# Patient Record
Sex: FEMALE | Race: WHITE | URBAN - METROPOLITAN AREA
[De-identification: names, ages, dates, MRNs, and addresses within clinical notes are randomized per-mention and may not be internally consistent; named-entity substitution may affect disease eponyms.]

---

## 2017-10-20 ENCOUNTER — HOSPITAL ENCOUNTER (OUTPATIENT)
Dept: VASCULAR LAB | Age: 67
Discharge: HOME | End: 2017-10-20
Attending: ORTHOPAEDIC SURGERY | Admitting: ORTHOPAEDIC SURGERY

## 2017-10-20 DIAGNOSIS — M79.89 OTHER SPECIFIED SOFT TISSUE DISORDERS (CODE): ICD-10-CM

## 2017-10-20 DIAGNOSIS — M79.89 LEFT LEG SWELLING: Primary | ICD-10-CM

## 2018-12-09 NOTE — PROGRESS NOTES
CP, palpitations, Hx of pericardial effusion or pericarditis  Respiratory: denies SOB, cough, hemoptysis, or pleurisy  Gastrointestinal: denies heart burn, dysphagia or esophageal dysmotility, denies change in bowel habits or Sx of IBD  Genitourinary: denies change in urine amount or urine appearance, denies frothy urine or Hx of nephrolithiasis  Hematologic/Lymphatic: denies abnormal bruising or bleeding, denies Hx of blood clots or recurrent miscarriages, denies swollen lymph nodes  Musculoskeletal:  refer to above HPI   Neurological: denies focal weakness, paresthesias/hyperesthesias or change in sensation, denies Hx of seizure, denies change in gait, balance, or memory  Psychiatric: denies Hx of depression, suicidal ideation or significant anxiety  Endocrine: +hypothyroidism on Synthroid, denies known Hx of osteoporosis, remote R wrist fx as a child, L foot fx (2014, does not recall injury)  Allergic/Immunologic: denies nasal congestion, chronic asthma, or hives    Past Medical History:   Diagnosis Date    Arthritis     Cerebral artery occlusion with cerebral infarction ([facility] [address]     Fibromyalgia     GERD (gastroesophageal reflux disease)     Glaucoma     Herniated cervical disc     Hyperlipidemia     Joint pain     Memory loss 2011    Morning joint stiffness     how long it varies    Muscle tenderness     MVP (mitral valve prolapse)     PIPE (obstructive sleep apnea)     USES BIPAP    Osteopenia     PONV (postoperative nausea and vomiting)     Rash     Rt. elbow    Thyroid disease         Past Surgical History:   Procedure Laterality Date    CARPAL TUNNEL RELEASE Bilateral     DILATION AND CURETTAGE OF UTERUS  11/22/2016    Hysteroscopy Myosure Ablation    HEEL SPUR SURGERY  1999    1998 or 1999    JOINT REPLACEMENT Right     KNEE    KNEE SURGERY Right     OTHER SURGICAL HISTORY      LUIS MIGUEL    PLANTAR FASCIA SURGERY      ROTATOR CUFF REPAIR Right     TONSILLECTOMY      TUBAL LIGATION II-XII grossly intact intact, face appears symmetrical, normal DTR, no evidence of muscle atrophy, 5/5 strength proximally and distally throughout in both upper and lower extremities, touch sensation grossly intact  INTEGUMENTARY: no malar rash, scattered telangiectasias on b/l cheeks, large patch of erythematous and scaly macular rash on the dorsal aspect of RUE immediately distal to the olecranon process c/w eczematous dermatitis, no alopecia, no petechiae, bruises, or palpable purpura, no nail or periungual changes, no clubbing or digital ulcers  PSYCH: normal mood    LABS:  I personally reviewed prior labs including:   CBC w/o diff wnl (11/5/17)  Normal renal function (10/8/18)  AST wnl (6/2/15)  TSH and free T4 wnl 10/8/18    RADIOLOGY REVIEW:  I personally reviewed prior imaging including:  MRI C-spine (1/12/16): Small moderate disc extrusion seen in the right side of the canal at the C6-7 level with slight inferior migration and narrowing of the right C7 neural foramen. Additional multilevel spondylotic changes as above. MRI L knee (8/17/17) radiology report: limited imaging through the knee shows prominent osteoarthritis. X-ray L hip (11/14/14) radiology report: arthritic changes are seen of the hip joints, left greater than right. SI joints are symmetrical. Multilevel degenerative changes are seen in the lower lumbar spine. Deformity of the right lateral ilium is again seen, unchanged, possibly due to posttraumatic or surgical deformity. DEXA study (11/30/15): osteopenia w/ T-score of -1.1 in the L femoral neck    Above results were discussed w/ the pt in detail during today's visit. ASSESSMENT/PLAN:  70 y.o. CF w/ fibromyalgia, generalized OA s/p R TKR 2013 and L TKR 2017, and osteopenia w/ Hx of L foot fx (pt does not recall antecedent injury).   PMHx pertinent for elevated BMI, DDD of L-spine, s/p L carpal tunnel release (2014) and R shoulder rotator cuff repair in 2015, PIPE on BiPAP, Hx of TIA in 11/11, HLD, and hypothyroidism. No active synovitis appreciated on exam today, clinical Hx not suggestive of inflammatory arthritis or CTD. Discussed w/ pt at length the disease course of fibromyalgia and importance of exercise, provided pt handout on disease information. Switch Zoloft to Cymbalta titrated up to 60 mg/day, provided pt handout on medicine. Cont Nabumetone 500 mg BID, check CMP. Repeat DEXA study, check vitamin D level and complete w/u for secondary causes of osteopenia. She states that she takes vitamin D 5000 IU daily w/ calcium carbonate 600 mg daily per PCP. Refer to PT and Pain Management for chronic LBP w/ radicular Sx. Refer to Dermatology for eczematous dermatitis.     Orders Placed This Encounter   Procedures    XR HAND LEFT (MIN 3 VIEWS)     Standing Status:   Future     Standing Expiration Date:   12/11/2019     Scheduling Instructions:      Evaluate for joint space narrowing, erosion, and chondrocalcinosis     Order Specific Question:   Reason for exam:     Answer:   Evaluate for joint space narrowing, erosion, and chondrocalcinosis    XR HAND RIGHT (MIN 3 VIEWS)     Standing Status:   Future     Standing Expiration Date:   12/11/2019     Scheduling Instructions:      Evaluate for joint space narrowing, erosion, and chondrocalcinosis     Order Specific Question:   Reason for exam:     Answer:   Evaluate for joint space narrowing, erosion, and chondrocalcinosis    C-Reactive Protein     Standing Status:   Future     Standing Expiration Date:   1/11/2019    Sedimentation Rate     Standing Status:   Future     Standing Expiration Date:   7/90/9466    Cyclic Citrul Peptide Antibody, IgG     Standing Status:   Future     Standing Expiration Date:   1/11/2019    Rheumatoid Factor     Standing Status:   Future     Standing Expiration Date:   1/11/2019    Vitamin D 25 Hydroxy     Standing Status:   Future     Standing Expiration Date:   4/11/2019   [facility] Comprehensive Metabolic Panel     Standing Status:   Future     Standing Expiration Date:   4/11/2019    Protein Electrophoresis, Urine     Standing Status:   Future     Standing Expiration Date:   2/11/2019    Electrophoresis Protein, Serum without Reflex to Immunofixation     Standing Status:   Future     Standing Expiration Date:   2/11/2019    PTH, Intact     Standing Status:   Future     Standing Expiration Date:   3/11/2019    CBC Auto Differential     Standing Status:   Future     Standing Expiration Date:   4/11/2019    External Referral To Dermatology     Referral Priority:   Routine     Referral Type:   Eval and Treat     Referral Reason:   Specialty Services Required     Requested Specialty:   Dermatology     Number of Visits Requested:   [id number] [facility]     Referral Priority:   Routine     Referral Type:   Eval and Treat     Referral Reason:   Specialty Services Required     Number of Visits Requested:   1     Outpatient Encounter Prescriptions as of 12/11/2018   Medication Sig Dispense Refill    methocarbamol (ROBAXIN) 750 MG tablet TAKE 1 TABLET BY MOUTH TWO TIMES A DAY  2    CPAP Machine MISC by Does not apply route      calcium carbonate 600 MG TABS tablet Take 1 tablet by mouth daily      DULoxetine (CYMBALTA) 30 MG extended release capsule Take 30 mg (1 tablet) daily for one week then increase to 60 mg (2 tablets) daily.  30 capsule 2    Loratadine-Pseudoephedrine (CLARITIN-D 12 HOUR PO) Take by mouth      Prenatal MV-Min-Fe Fum-FA-DHA (PRENATAL 1 PO) Take by mouth daily      aspirin 81 MG tablet Take 81 mg by mouth daily      nabumetone (RELAFEN) 500 MG tablet Take 500 mg by mouth 2 times daily      simvastatin (ZOCOR) 20 MG tablet Take 40 mg by mouth nightly       levothyroxine (SYNTHROID) 75 MCG tablet Take 75 mcg by mouth Daily      latanoprost (XALATAN) 0.005 % ophthalmic solution 1 drop nightly      vitamin D (CHOLECALCIFEROL) 5000 UNITS CAPS capsule Take 5,000

## 2018-12-11 ENCOUNTER — TELEPHONE (OUTPATIENT)
Dept: PAIN MANAGEMENT | Age: 68
End: 2018-12-11

## 2018-12-11 ENCOUNTER — OFFICE VISIT (OUTPATIENT)
Dept: RHEUMATOLOGY | Age: 68
End: 2018-12-11
Payer: COMMERCIAL

## 2018-12-11 VITALS
TEMPERATURE: 97.8 F | HEIGHT: 67 IN | SYSTOLIC BLOOD PRESSURE: 119 MMHG | DIASTOLIC BLOOD PRESSURE: 64 MMHG | BODY MASS INDEX: 43.1 KG/M2 | HEART RATE: 89 BPM | WEIGHT: 274.6 LBS

## 2018-12-11 DIAGNOSIS — M15.9 GENERALIZED OSTEOARTHRITIS: ICD-10-CM

## 2018-12-11 DIAGNOSIS — Z79.899 HIGH RISK MEDICATION USE: ICD-10-CM

## 2018-12-11 DIAGNOSIS — M54.41 CHRONIC RIGHT-SIDED LOW BACK PAIN WITH RIGHT-SIDED SCIATICA: ICD-10-CM

## 2018-12-11 DIAGNOSIS — E55.9 VITAMIN D DEFICIENCY: ICD-10-CM

## 2018-12-11 DIAGNOSIS — M79.7 PRIMARY FIBROMYALGIA SYNDROME: ICD-10-CM

## 2018-12-11 DIAGNOSIS — M79.7 PRIMARY FIBROMYALGIA SYNDROME: Primary | ICD-10-CM

## 2018-12-11 DIAGNOSIS — G89.29 CHRONIC RIGHT-SIDED LOW BACK PAIN WITH RIGHT-SIDED SCIATICA: ICD-10-CM

## 2018-12-11 DIAGNOSIS — L29.8 CHRONIC PRURITIC RASH IN ADULT: ICD-10-CM

## 2018-12-11 LAB
A/G RATIO: 2.1 (ref 1.1–2.2)
ALBUMIN SERPL-MCNC: 4.6 G/DL (ref 3.4–5)
ALP BLD-CCNC: 105 U/L (ref 40–129)
ALT SERPL-CCNC: 41 U/L (ref 10–40)
ANION GAP SERPL CALCULATED.3IONS-SCNC: 17 MMOL/L (ref 3–16)
AST SERPL-CCNC: 31 U/L (ref 15–37)
BASOPHILS ABSOLUTE: 0.1 K/UL (ref 0–0.2)
BASOPHILS RELATIVE PERCENT: 2.2 %
BILIRUB SERPL-MCNC: 0.4 MG/DL (ref 0–1)
BUN BLDV-MCNC: 18 MG/DL (ref 7–20)
C-REACTIVE PROTEIN: 2.9 MG/L (ref 0–5.1)
CALCIUM SERPL-MCNC: 9.7 MG/DL (ref 8.3–10.6)
CHLORIDE BLD-SCNC: 101 MMOL/L (ref 99–110)
CO2: 25 MMOL/L (ref 21–32)
CREAT SERPL-MCNC: 0.9 MG/DL (ref 0.6–1.2)
EOSINOPHILS ABSOLUTE: 0.2 K/UL (ref 0–0.6)
EOSINOPHILS RELATIVE PERCENT: 4.2 %
GFR AFRICAN AMERICAN: >60
GFR NON-AFRICAN AMERICAN: >60
GLOBULIN: 2.2 G/DL
GLUCOSE BLD-MCNC: 110 MG/DL (ref 70–99)
HCT VFR BLD CALC: 43.3 % (ref 36–48)
HEMOGLOBIN: 14.7 G/DL (ref 12–16)
LYMPHOCYTES ABSOLUTE: 1.2 K/UL (ref 1–5.1)
LYMPHOCYTES RELATIVE PERCENT: 24.6 %
MCH RBC QN AUTO: 32.9 PG (ref 26–34)
MCHC RBC AUTO-ENTMCNC: 33.9 G/DL (ref 31–36)
MCV RBC AUTO: 96.9 FL (ref 80–100)
MONOCYTES ABSOLUTE: 0.4 K/UL (ref 0–1.3)
MONOCYTES RELATIVE PERCENT: 8.5 %
NEUTROPHILS ABSOLUTE: 3 K/UL (ref 1.7–7.7)
NEUTROPHILS RELATIVE PERCENT: 60.5 %
PARATHYROID HORMONE INTACT: 47.1 PG/ML (ref 14–72)
PDW BLD-RTO: 13.7 % (ref 12.4–15.4)
PLATELET # BLD: 257 K/UL (ref 135–450)
PMV BLD AUTO: 8.7 FL (ref 5–10.5)
POTASSIUM SERPL-SCNC: 5 MMOL/L (ref 3.5–5.1)
PROTEIN PROTEIN: 0.01 G/DL
PROTEIN PROTEIN: 15 MG/DL
RBC # BLD: 4.47 M/UL (ref 4–5.2)
RHEUMATOID FACTOR: 13 IU/ML
SEDIMENTATION RATE, ERYTHROCYTE: 9 MM/HR (ref 0–30)
SODIUM BLD-SCNC: 143 MMOL/L (ref 136–145)
TOTAL PROTEIN: 6.8 G/DL (ref 6.4–8.2)
VITAMIN D 25-HYDROXY: 66.6 NG/ML
WBC # BLD: 4.9 K/UL (ref 4–11)

## 2018-12-11 PROCEDURE — 99204 OFFICE O/P NEW MOD 45 MIN: CPT | Performed by: INTERNAL MEDICINE

## 2018-12-11 RX ORDER — PHENOL 1.4 %
1 AEROSOL, SPRAY (ML) MUCOUS MEMBRANE DAILY
COMMUNITY

## 2018-12-11 RX ORDER — METHOCARBAMOL 750 MG/1
TABLET, FILM COATED ORAL
Refills: 2 | COMMUNITY
Start: 2018-10-06

## 2018-12-11 RX ORDER — DULOXETIN HYDROCHLORIDE 30 MG/1
CAPSULE, DELAYED RELEASE ORAL
Qty: 30 CAPSULE | Refills: 2 | Status: SHIPPED | OUTPATIENT
Start: 2018-12-11 | End: 2019-06-18 | Stop reason: SDUPTHER

## 2018-12-11 NOTE — PATIENT INSTRUCTIONS
products, or medicine for depression, mental illness, Parkinson's disease, migraine headaches, serious infections, or prevention of nausea and vomiting. Ask your doctor before making any changes in how or when you take your medications. To make sure duloxetine is safe for you, tell your doctor if you have ever had:  · liver or kidney disease;  · seizures or epilepsy;  · a bleeding or blood clotting disorder;  · high blood pressure;  · narrow-angle glaucoma;  · bipolar disorder (manic depression); or  · drug addiction or suicidal thoughts. Some young people have thoughts about suicide when first taking an antidepressant. Your doctor will need to check your progress at regular visits while you are using duloxetine. Your family or other caregivers should also be alert to changes in your mood or symptoms. It is not known whether duloxetine will harm an unborn baby. However, duloxetine may cause problems in a  if you take the medicine during the third trimester of pregnancy. Tell your doctor if you are pregnant or plan to become pregnant while using this medicine. If you are pregnant, your name may be listed on a pregnancy registry. This is to track the outcome of the pregnancy and to evaluate any effects of duloxetine on the baby. Duloxetine can pass into breast milk, but effects on the nursing baby are not known. Tell your doctor if you are breast-feeding. Duloxetine is not approved for use by anyone younger than 25years old. How should I take duloxetine? Follow all directions on your prescription label. Do not take this medicine in larger or smaller amounts or for longer than recommended. You may take duloxetine with or without food. Do not crush, chew, break, or open an extended-release capsule. Swallow it whole. It may take 1 to 4 weeks before your symptoms improve. Keep using the medication as directed. Do not stop using duloxetine without first talking to your doctor.   You may have unpleasant brain chemicals (serotonin and norepinephrine) that help control pain levels: duloxetine (Cymbalta) and milnacipran (Savella). Older drugs that affect these same brain chemicals also may be used to treat fibromyalgia. These include amitriptyline (Elavil) and cyclobenzaprine (Flexeril). Other antidepressant drugs can be helpful in some patients. Side effects vary by the drug. Ask your doctor about the risks and benefits of your medicine. The other drug approved for fibromyalgia is pregabalin (Lyrica). Pregabalin and another drug, gabapentin (Neurontin), work by blocking the over activity of nerve cells involved in pain transmission. These medicines may cause dizziness, sleepiness, swelling and weight gain. Doctors do not recommend opioid narcotics for treating fibromyalgia. The reason for this is that research evidence suggests these drugs are not of great benefit to most people with fibromyalgia. In fact, they may cause greater pain sensitivity or make pain persist. Tramadol (Ultram) may be used to treat fibromyalgia pain if short-term use of an opioid narcotic is needed. Over-the-counter medicines such as acetaminophen (Tylenol) or nonsteroidal anti-inflammatory drugs (commonly called NSAIDs) like ibuprofen (Advil, Motrin) or naproxen (Aleve, Anaprox) are not effective for fibromyalgia pain. Yet, these drugs may be useful to treat the pain triggers of fibromyalgia. Thus, they are most useful in people who have other causes for pain such as arthritis in addition to fibromyalgia. For sleep problems, some of the medicines that treat pain also improve sleep. These include cyclobenzaprine (Flexeril), amitriptyline (Elavil), gabapentin (Neurontin) or pregabalin (Lyrica). It is not recommended that patients with fibromyalgia take sleeping medicines like zolpidem (Ambien) or benzodiazepine medications.   Other Therapies: People with fibromyalgia should use non-drug treatments as well as any medicines their doctors

## 2018-12-12 ENCOUNTER — TELEPHONE (OUTPATIENT)
Dept: RHEUMATOLOGY | Age: 68
End: 2018-12-12

## 2018-12-12 LAB
ALBUMIN SERPL-MCNC: 3.6 G/DL (ref 3.1–4.9)
ALPHA-1-GLOBULIN: 0.3 G/DL (ref 0.2–0.4)
ALPHA-2-GLOBULIN: 0.9 G/DL (ref 0.4–1.1)
BETA GLOBULIN: 1.3 G/DL (ref 0.9–1.6)
GAMMA GLOBULIN: 0.8 G/DL (ref 0.6–1.8)
SPE/IFE INTERPRETATION: NORMAL
URINE ELECTROPHORESIS INTERP: NORMAL

## 2018-12-13 ENCOUNTER — TELEPHONE (OUTPATIENT)
Dept: RHEUMATOLOGY | Age: 68
End: 2018-12-13

## 2018-12-13 LAB — CCP IGG ANTIBODIES: 4 UNITS (ref 0–19)

## 2018-12-14 ENCOUNTER — TELEPHONE (OUTPATIENT)
Dept: INTERNAL MEDICINE CLINIC | Age: 68
End: 2018-12-14

## 2018-12-17 ENCOUNTER — TELEPHONE (OUTPATIENT)
Dept: INTERNAL MEDICINE CLINIC | Age: 68
End: 2018-12-17

## 2019-02-19 RX ORDER — DULOXETIN HYDROCHLORIDE 60 MG/1
60 CAPSULE, DELAYED RELEASE ORAL DAILY
Qty: 90 CAPSULE | Refills: 1 | Status: SHIPPED | OUTPATIENT
Start: 2019-02-19 | End: 2019-06-18 | Stop reason: SDUPTHER

## 2019-06-18 RX ORDER — DULOXETIN HYDROCHLORIDE 60 MG/1
60 CAPSULE, DELAYED RELEASE ORAL DAILY
Qty: 90 CAPSULE | Refills: 1 | Status: SHIPPED | OUTPATIENT
Start: 2019-06-18 | End: 2019-12-24

## 2019-12-24 RX ORDER — DULOXETIN HYDROCHLORIDE 60 MG/1
60 CAPSULE, DELAYED RELEASE ORAL DAILY
Qty: 90 CAPSULE | Refills: 1 | Status: SHIPPED | OUTPATIENT
Start: 2019-12-24

## 2021-12-09 ENCOUNTER — PROCEDURE VISIT (OUTPATIENT)
Dept: AUDIOLOGY | Age: 71
End: 2021-12-09
Payer: COMMERCIAL

## 2021-12-09 DIAGNOSIS — H90.3 SENSORINEURAL HEARING LOSS (SNHL) OF BOTH EARS: Primary | ICD-10-CM

## 2021-12-09 DIAGNOSIS — H93.13 SUBJECTIVE TINNITUS OF BOTH EARS: ICD-10-CM

## 2021-12-09 PROCEDURE — 92557 COMPREHENSIVE HEARING TEST: CPT | Performed by: AUDIOLOGIST

## 2021-12-09 PROCEDURE — 92567 TYMPANOMETRY: CPT | Performed by: AUDIOLOGIST

## 2021-12-09 NOTE — PROGRESS NOTES
[facility]  Division of Audiology/Otolaryngology    12/9/2021     PCP: Purnima Silverman   MRN: [id number]     AUDIOLOGIC AND OTHER PERTINENT MEDICAL HISTORY:      Armen Hilliard was seen for hearing and middle ear evaluation. Rachel Padilla DO is referral source of today's appointment. Patient presents with pulsating tinnitusperceived as worse in right ear. She notices the tinnitus changes in pitch, only if she happens to lay down. She feels hearing inability is greater in right ear. ASSESSMENT AND FINDINGS:     Otoscopy revealed: Visible tympanic membrane bilaterally    RIGHT EAR:  Hearing Sensitivity: Mild-moderate sensory loss   Word Recognition: Excellent (%), based on NU-6   Tympanometry: Normal peak pressure and compliance, Type A tympanogram, consistent with normal middle ear function. Acoustic Reflexes: Ipsilateral: Absent. LEFT EAR:  Hearing Sensitivity: Mild-moderate sensory loss   Word Recognition: Excellent (%), based on NU-6   Tympanometry: Normal peak pressure and compliance, Type A tympanogram, consistent with normal middle ear function. Acoustic Reflexes: Ipsilateral: Absent. Quick SIN (hearing performance in noise) assessment was administered. Armen Hilliard scored  20/25.5, consistent with 5.5 dB signal-noise ratio loss. This suggests difficulty hearing during the presence of noise or complex listening enviornments. COUNSELING:        Reviewed purpose of testing completed today, general auditory system function, and results obtained today. Patient was provided with a copy of audiogram.           Degree of   Hearing Sensitivity dB Range   Within Normal Limits (WNL) 0 - 20   Mild 20 - 40   Moderate 40 - 55   Moderately-Severe 55 - 70   Severe 70 - 90   Profound 90 +          RECOMMENDATIONS:          Rachel Padilla DO to medically advise.       Electronically signed by Yulissa Bergman on 12/9/21 at 3:58 PM.

## 2021-12-21 ENCOUNTER — OFFICE VISIT (OUTPATIENT)
Dept: ENT CLINIC | Age: 71
End: 2021-12-21
Payer: COMMERCIAL

## 2021-12-21 VITALS — HEART RATE: 83 BPM | SYSTOLIC BLOOD PRESSURE: 145 MMHG | DIASTOLIC BLOOD PRESSURE: 87 MMHG | TEMPERATURE: 97.1 F

## 2021-12-21 DIAGNOSIS — H90.3 SENSORINEURAL HEARING LOSS (SNHL) OF BOTH EARS: Primary | ICD-10-CM

## 2021-12-21 DIAGNOSIS — H93.13 TINNITUS OF BOTH EARS: ICD-10-CM

## 2021-12-21 DIAGNOSIS — J30.9 ALLERGIC RHINITIS, UNSPECIFIED SEASONALITY, UNSPECIFIED TRIGGER: ICD-10-CM

## 2021-12-21 PROCEDURE — 99203 OFFICE O/P NEW LOW 30 MIN: CPT | Performed by: STUDENT IN AN ORGANIZED HEALTH CARE EDUCATION/TRAINING PROGRAM

## 2021-12-21 RX ORDER — FLUTICASONE PROPIONATE 50 MCG
1 SPRAY, SUSPENSION (ML) NASAL DAILY
Qty: 32 G | Refills: 1 | Status: SHIPPED | OUTPATIENT
Start: 2021-12-21

## 2021-12-21 NOTE — PROGRESS NOTES
[address] [facility]  NEW PATIENT HISTORY AND PHYSICAL NOTE      Patient Name: [address] [place] [address] Record Number:  [id number]  Primary Care Physician:  Silvestre Perry    ChiefComplaint     Chief Complaint   Patient presents with    Other     Review hearing test, patient states she has had diminished hearing, she hears noise or cricket sounds in her ears, both ears vary with noise, and feels a wooshing noise as well. History of Present Illness     Xiomara Muniz is an 66 y.o. female presenting with hearing loss. Hearing seems diminished in both ears over the last several months. Feels like understanding conversations is hard. Hearing \"cricket\" sounds in the wears. Hearing a tone in her right ear, goran pitched at times. Occasionally has wooshing sound in both ears, not patterned like heartbeat - this sound is not consistent. No otalgia. No otorrhea. No history of chronic ear infections. No history of otologic surgery. No family history of early onset hearing loss. No loud noise exposures. Denies exposure to chemotherapy. Denies vertigo.       Past Medical History     Past Medical History:   Diagnosis Date    Arthritis     Cerebral artery occlusion with cerebral infarction ([facility] [address]     Fibromyalgia     GERD (gastroesophageal reflux disease)     Glaucoma     Herniated cervical disc     Hyperlipidemia     Joint pain     Memory loss 2011    Morning joint stiffness     how long it varies    Muscle tenderness     MVP (mitral valve prolapse)     PIPE (obstructive sleep apnea)     USES BIPAP    Osteopenia     PONV (postoperative nausea and vomiting)     Rash     Rt. elbow    Thyroid disease        Past Surgical History     Past Surgical History:   Procedure Laterality Date    CARPAL TUNNEL RELEASE Bilateral     DILATION AND CURETTAGE OF UTERUS  11/22/2016    Hysteroscopy Myosure Ablation   [address] or 1999    JOINT REPLACEMENT Right     KNEE    KNEE SURGERY Right     OTHER SURGICAL HISTORY      LUIS MIGUEL    PLANTAR FASCIA SURGERY      ROTATOR CUFF REPAIR Right     TONSILLECTOMY      TUBAL LIGATION         Family History     Family History   Problem Relation Age of Onset    Dementia Mother     Heart Disease Mother     Alzheimer's Disease Father     Cancer Father     Heart Disease Maternal Grandmother     Stroke Maternal Grandmother     Cancer Maternal Grandfather     Alcohol Abuse Maternal Grandfather        Social History     Social History     Tobacco Use    Smoking status: Never Smoker    Smokeless tobacco: Never Used   Vaping Use    Vaping Use: Never used   Substance Use Topics    Alcohol use: No    Drug use: No        Allergies     Allergies   Allergen Reactions    Amoxicillin Hives     SEVERE HIVES, ORAL, FACIAL SWELLING    Codeine     Demerol Hcl [Meperidine]     Indocin [Indomethacin]      Sores in mouth    Nsaids      Sores in mouth    Theophyllines        Medications     Current Outpatient Medications   Medication Sig Dispense Refill    fluticasone (FLONASE) 50 MCG/ACT nasal spray 1 spray by Each Nostril route daily 32 g 1    DULoxetine (CYMBALTA) 60 MG extended release capsule TAKE 1 CAPSULE BY MOUTH  DAILY 90 capsule 1    methocarbamol (ROBAXIN) 750 MG tablet TAKE 1 TABLET BY MOUTH TWO TIMES A DAY  2    CPAP Machine MISC by Does not apply route      calcium carbonate 600 MG TABS tablet Take 1 tablet by mouth daily      Loratadine-Pseudoephedrine (CLARITIN-D 12 HOUR PO) Take by mouth      Prenatal MV-Min-Fe Fum-FA-DHA (PRENATAL 1 PO) Take by mouth daily      aspirin 81 MG tablet Take 81 mg by mouth daily      nabumetone (RELAFEN) 500 MG tablet Take 500 mg by mouth 2 times daily      simvastatin (ZOCOR) 20 MG tablet Take 40 mg by mouth nightly       levothyroxine (SYNTHROID) 75 MCG tablet Take 75 mcg by mouth Daily      latanoprost (XALATAN) 0.005 % ophthalmic solution 1 drop nightly      vitamin D (CHOLECALCIFEROL) 5000 UNITS CAPS capsule Take 5,000 Units by mouth nightly       HYDROcodone-acetaminophen (NORCO) 5-325 MG per tablet Take 1 tablet by mouth every 6 hours as needed for Pain       No current facility-administered medications for this visit. Review of Systems     REVIEW OF SYSTEMS  The following systems were reviewed and revealed the following in addition to any already discussed in the HPI:    CONSTITUTIONAL: no weight loss, no fever, no night sweats, no chills  EYES: no vision changes, no blurry vision  EARS: +hearing loss, no otalgia  NOSE: no epistaxis, no rhinorrhea  THROAT: No voice changes, no sore throat, no dysphagia      PhysicalExam     Vitals:    12/21/21 1045   BP: (!) 145/87   Site: Left Upper Arm   Position: Sitting   Cuff Size: Large Adult   Pulse: 83   Temp: 97.1 °F (36.2 °C)   TempSrc: Temporal       PHYSICAL EXAM  BP (!) 145/87 (Site: Left Upper Arm, Position: Sitting, Cuff Size: Large Adult)   Pulse 83   Temp 97.1 °F (36.2 °C) (Temporal)     GENERAL: No acute distress, alert and oriented, no hoarseness  EYES: EOMI, Anti-icteric  NOSE: On anterior rhinoscopy there is no epistaxis, nasal mucosa moist and normal appearing, no purulent drainage. EARS: Normal external appearance; on portable otomicroscopy:     -Ad: External auditory canal without stenosis, tympanic membrane clear, no middle ear effusions or retractions.      -As: External auditory canal without stenosis, tympanic membrane clear, no middle ear effusions or retractions.    Pneumatic otoscopy: Bilateral tympanic membranes mobile pneumatic otoscopy  FACE: HB 1/6 bilaterally, symmetric appearing, sensation equal bilaterally  ORAL CAVITY: No masses or lesions visualized or palpated, uvula is midline, moist mucous membranes, absent tonsils  NECK: Normal range of motion, no thyromegaly, trachea is midline, no palpable lymphadenopathy or neck masses, no crepitus  NEURO: Cranial Nerves 2, 3, 4, 5, 6, 7, 11, 12 grossly intact bilaterally     I have performed a head and neck physical exam personally or was physically present during the key or critical portions of the service. Data/Imaging Review     Comprehensive audiological evaluation performed in the office today by Adam BUSBY was independently reviewed by myself which demonstrates: Au: Mild to moderate sensorineural hearing loss    WRS 92% right, WRS 96% left. Type A tympanogram right. Type A tympanogram left. Assessment and Plan     1. Sensorineural hearing loss (SNHL) of both ears  - Would likely benefit from amplification with hearing aids. Provided with hearing aid clearance form as well as copy of audiogram.  - Encouraged loud noise avoidance and wearing of hearing protection when exposed. - Recommend surveillance of hearing with comprehensive audiological evaluation in 1-2 years. 2. Tinnitus of both ears  - Masking techniques  - Hearing aids with tinnitus masking technology    3. Allergic rhinitis, unspecified seasonality, unspecified trigger  - Start Flonase daily      Follow Up     Return if symptoms worsen or fail to improve. Dr. [place] [id number]  Department of Otolaryngology/Head & Neck Surgery  12/21/21    Medical Decision Making: The following items were considered in medical decision making:  Independent review of images  Review / order clinical lab tests  Review / order radiology tests  Decision to obtain old records    This note was generated completely or in part utilizing Dragon dictation speech recognition software. Occasionally, words are mistranscribed and despite editing, the text may contain inaccuracies due to incorrect word recognition. If further clarification is needed please contact the office at [phone].

## 2024-09-05 RX ORDER — GABAPENTIN 300 MG/1
300 CAPSULE ORAL 2 TIMES DAILY
COMMUNITY

## 2024-09-05 RX ORDER — ASCORBIC ACID 500 MG
1000 TABLET ORAL DAILY
COMMUNITY

## 2024-09-05 NOTE — PROGRESS NOTES
DATE _9/6__      PLACE ___ [address]                          TIME _1100__                                             __x_ [address]                           Arrival _0930__     Surgeons office to give time ___      Surgery dates,times and arrival times are subject to change.Please check with your surgeon.  Bring a photo I.D.,insurance card and form of payment if you have a co pay.  Plan for someone 18 years or older to stay on site while you are her and to take you home after surgery.You are not permitted to drive yourself home. You cannot leave via Uber, Lyft, Taxi or Medical Transport by yourself. You must have someone with you. It is strongly suggested that someone stay with you the first 24 hours after anesthesia. Your surgery will be cancelled if you do not have a ride home. A parent or legal guardian guardian must stay with a child until the child is discharged. Please do not bring other children.  A History/Physical is required to be completed and dated within 30 days of your surgery. To be done by PCP __ Surgeon _x__or Hospitalist ___  You may be required to get labs __ EKG __ or a chest Xray ___ prior to surgery. To be done by ____  Nothing to eat or drink after midnight the evening prior to surgery.  If your surgeon requires a bowel prep, follow their instructions.  You may brush your teeth.  Bring a complete list of your medications including vitamins and supplement with the name,dose and frequency.  Take the following medications with a sip of water the AM of surgery ___levothyroxine_________________________________   DR Levine patients do not take any medications the AM of surgery.  If you can not be reached by phone to give specific medication instructions,you may use your inhalers,take your heart, blood pressure,seizure and thyroid medications with a sip of water the AM of surgery. Bring your rescue inhaler with you if you use one.  DO NOT take blood pressure medications ending in  until you are assigned a room.  If you develop any illness prior to surgery let your surgeon know (fever,cough ,cold sore throat,nausea or vomiting).  If you have any skin breakdown,signs of infection or dental issues, notify your surgeon.  Make sure your voice mail is set up and not full so you are able to receive messages regarding your surgery.  Visitor policies are subject to change- check with your care team.   Masking is at the discretion of the facility.  Other ________________________________________________________________________________________________________      For any questions call Pre-Admission testing at  [phone]   Fax  [phone]    All of the above information was  reviewed with patient/caregiver and they verbalize understanding.    Patient/Representative per phone _x__  Patient not reached instructions left on voicemail ___ Office notified unable to reach _____  Above instructions sent to MY Chart ___

## 2024-09-06 ENCOUNTER — ANESTHESIA (OUTPATIENT)
Dept: OPERATING ROOM | Age: 74
End: 2024-09-06
Payer: MEDICARE

## 2024-09-06 ENCOUNTER — HOSPITAL ENCOUNTER (OUTPATIENT)
Age: 74
Setting detail: OUTPATIENT SURGERY
Discharge: HOME | End: 2024-09-06
Attending: ANESTHESIOLOGY | Admitting: ANESTHESIOLOGY
Payer: MEDICARE

## 2024-09-06 ENCOUNTER — ANESTHESIA EVENT (OUTPATIENT)
Dept: OPERATING ROOM | Age: 74
End: 2024-09-06
Payer: MEDICARE

## 2024-09-06 ENCOUNTER — APPOINTMENT (OUTPATIENT)
Dept: GENERAL RADIOLOGY | Age: 74
End: 2024-09-06
Attending: ANESTHESIOLOGY
Payer: MEDICARE

## 2024-09-06 VITALS
RESPIRATION RATE: 21 BRPM | OXYGEN SATURATION: 96 % | DIASTOLIC BLOOD PRESSURE: 64 MMHG | HEART RATE: 74 BPM | HEIGHT: 67 IN | TEMPERATURE: 97.6 F | BODY MASS INDEX: 44.65 KG/M2 | SYSTOLIC BLOOD PRESSURE: 133 MMHG | WEIGHT: 284.5 LBS

## 2024-09-06 PROCEDURE — 7100000001 HC PACU RECOVERY - ADDTL 15 MIN: Performed by: ANESTHESIOLOGY

## 2024-09-06 PROCEDURE — 3700000001 HC ADD 15 MINUTES (ANESTHESIA): Performed by: ANESTHESIOLOGY

## 2024-09-06 PROCEDURE — 6360000002 HC RX W HCPCS: Performed by: NURSE ANESTHETIST, CERTIFIED REGISTERED

## 2024-09-06 PROCEDURE — 3600000012 HC SURGERY LEVEL 2 ADDTL 15MIN: Performed by: ANESTHESIOLOGY

## 2024-09-06 PROCEDURE — C1889 IMPLANT/INSERT DEVICE, NOC: HCPCS | Performed by: ANESTHESIOLOGY

## 2024-09-06 PROCEDURE — 7100000010 HC PHASE II RECOVERY - FIRST 15 MIN: Performed by: ANESTHESIOLOGY

## 2024-09-06 PROCEDURE — 7100000000 HC PACU RECOVERY - FIRST 15 MIN: Performed by: ANESTHESIOLOGY

## 2024-09-06 PROCEDURE — 2580000003 HC RX 258: Performed by: ANESTHESIOLOGY

## 2024-09-06 PROCEDURE — 2500000003 HC RX 250 WO HCPCS: Performed by: ANESTHESIOLOGY

## 2024-09-06 PROCEDURE — 7100000011 HC PHASE II RECOVERY - ADDTL 15 MIN: Performed by: ANESTHESIOLOGY

## 2024-09-06 PROCEDURE — 3600000002 HC SURGERY LEVEL 2 BASE: Performed by: ANESTHESIOLOGY

## 2024-09-06 PROCEDURE — 6360000002 HC RX W HCPCS: Performed by: ANESTHESIOLOGY

## 2024-09-06 PROCEDURE — 6370000000 HC RX 637 (ALT 250 FOR IP): Performed by: ANESTHESIOLOGY

## 2024-09-06 PROCEDURE — 3700000000 HC ANESTHESIA ATTENDED CARE: Performed by: ANESTHESIOLOGY

## 2024-09-06 PROCEDURE — 2709999900 HC NON-CHARGEABLE SUPPLY: Performed by: ANESTHESIOLOGY

## 2024-09-06 PROCEDURE — 2500000003 HC RX 250 WO HCPCS: Performed by: NURSE ANESTHETIST, CERTIFIED REGISTERED

## 2024-09-06 RX ORDER — PROCHLORPERAZINE EDISYLATE 5 MG/ML
5 INJECTION INTRAMUSCULAR; INTRAVENOUS
Status: DISCONTINUED | OUTPATIENT
Start: 2024-09-06 | End: 2024-09-06 | Stop reason: HOSPADM

## 2024-09-06 RX ORDER — SODIUM CHLORIDE 9 MG/ML
INJECTION, SOLUTION INTRAVENOUS PRN
Status: DISCONTINUED | OUTPATIENT
Start: 2024-09-06 | End: 2024-09-06 | Stop reason: HOSPADM

## 2024-09-06 RX ORDER — DEXMEDETOMIDINE HYDROCHLORIDE 100 UG/ML
INJECTION, SOLUTION INTRAVENOUS PRN
Status: DISCONTINUED | OUTPATIENT
Start: 2024-09-06 | End: 2024-09-06 | Stop reason: SDUPTHER

## 2024-09-06 RX ORDER — LIDOCAINE HYDROCHLORIDE 10 MG/ML
0.5 INJECTION, SOLUTION EPIDURAL; INFILTRATION; INTRACAUDAL; PERINEURAL ONCE
Status: DISCONTINUED | OUTPATIENT
Start: 2024-09-06 | End: 2024-09-06 | Stop reason: HOSPADM

## 2024-09-06 RX ORDER — LIDOCAINE HYDROCHLORIDE 20 MG/ML
INJECTION, SOLUTION INFILTRATION; PERINEURAL PRN
Status: DISCONTINUED | OUTPATIENT
Start: 2024-09-06 | End: 2024-09-06 | Stop reason: SDUPTHER

## 2024-09-06 RX ORDER — SODIUM CHLORIDE 0.9 % (FLUSH) 0.9 %
5-40 SYRINGE (ML) INJECTION PRN
Status: DISCONTINUED | OUTPATIENT
Start: 2024-09-06 | End: 2024-09-06 | Stop reason: HOSPADM

## 2024-09-06 RX ORDER — HYDROMORPHONE HYDROCHLORIDE 2 MG/ML
0.25 INJECTION, SOLUTION INTRAMUSCULAR; INTRAVENOUS; SUBCUTANEOUS EVERY 5 MIN PRN
Status: DISCONTINUED | OUTPATIENT
Start: 2024-09-06 | End: 2024-09-06 | Stop reason: HOSPADM

## 2024-09-06 RX ORDER — IOPAMIDOL 612 MG/ML
INJECTION, SOLUTION INTRATHECAL
Status: DISCONTINUED | OUTPATIENT
Start: 2024-09-06 | End: 2024-09-06 | Stop reason: HOSPADM

## 2024-09-06 RX ORDER — ONDANSETRON 2 MG/ML
INJECTION INTRAMUSCULAR; INTRAVENOUS PRN
Status: DISCONTINUED | OUTPATIENT
Start: 2024-09-06 | End: 2024-09-06 | Stop reason: SDUPTHER

## 2024-09-06 RX ORDER — OXYCODONE HYDROCHLORIDE 5 MG/1
10 TABLET ORAL PRN
Status: DISCONTINUED | OUTPATIENT
Start: 2024-09-06 | End: 2024-09-06 | Stop reason: HOSPADM

## 2024-09-06 RX ORDER — IPRATROPIUM BROMIDE AND ALBUTEROL SULFATE 2.5; .5 MG/3ML; MG/3ML
1 SOLUTION RESPIRATORY (INHALATION)
Status: DISCONTINUED | OUTPATIENT
Start: 2024-09-06 | End: 2024-09-06 | Stop reason: HOSPADM

## 2024-09-06 RX ORDER — FAMOTIDINE 10 MG/ML
INJECTION, SOLUTION INTRAVENOUS PRN
Status: DISCONTINUED | OUTPATIENT
Start: 2024-09-06 | End: 2024-09-06 | Stop reason: SDUPTHER

## 2024-09-06 RX ORDER — OXYCODONE HYDROCHLORIDE 5 MG/1
5 TABLET ORAL
Status: DISCONTINUED | OUTPATIENT
Start: 2024-09-06 | End: 2024-09-06 | Stop reason: HOSPADM

## 2024-09-06 RX ORDER — METHOCARBAMOL 100 MG/ML
INJECTION, SOLUTION INTRAMUSCULAR; INTRAVENOUS PRN
Status: DISCONTINUED | OUTPATIENT
Start: 2024-09-06 | End: 2024-09-06 | Stop reason: SDUPTHER

## 2024-09-06 RX ORDER — SODIUM CHLORIDE 9 MG/ML
INJECTION, SOLUTION INTRAVENOUS CONTINUOUS
Status: DISCONTINUED | OUTPATIENT
Start: 2024-09-06 | End: 2024-09-06 | Stop reason: HOSPADM

## 2024-09-06 RX ORDER — APREPITANT 40 MG/1
40 CAPSULE ORAL ONCE
Status: COMPLETED | OUTPATIENT
Start: 2024-09-06 | End: 2024-09-06

## 2024-09-06 RX ORDER — NALOXONE HYDROCHLORIDE 0.4 MG/ML
INJECTION, SOLUTION INTRAMUSCULAR; INTRAVENOUS; SUBCUTANEOUS PRN
Status: DISCONTINUED | OUTPATIENT
Start: 2024-09-06 | End: 2024-09-06 | Stop reason: HOSPADM

## 2024-09-06 RX ORDER — ACETAMINOPHEN 500 MG
1000 TABLET ORAL ONCE
Status: COMPLETED | OUTPATIENT
Start: 2024-09-06 | End: 2024-09-06

## 2024-09-06 RX ORDER — ONDANSETRON 2 MG/ML
4 INJECTION INTRAMUSCULAR; INTRAVENOUS
Status: DISCONTINUED | OUTPATIENT
Start: 2024-09-06 | End: 2024-09-06 | Stop reason: HOSPADM

## 2024-09-06 RX ORDER — MEPERIDINE HYDROCHLORIDE 25 MG/ML
12.5 INJECTION INTRAMUSCULAR; INTRAVENOUS; SUBCUTANEOUS EVERY 5 MIN PRN
Status: DISCONTINUED | OUTPATIENT
Start: 2024-09-06 | End: 2024-09-06 | Stop reason: HOSPADM

## 2024-09-06 RX ORDER — MIDAZOLAM HYDROCHLORIDE 1 MG/ML
INJECTION INTRAMUSCULAR; INTRAVENOUS PRN
Status: DISCONTINUED | OUTPATIENT
Start: 2024-09-06 | End: 2024-09-06 | Stop reason: SDUPTHER

## 2024-09-06 RX ORDER — HYDROMORPHONE HYDROCHLORIDE 2 MG/ML
0.5 INJECTION, SOLUTION INTRAMUSCULAR; INTRAVENOUS; SUBCUTANEOUS EVERY 5 MIN PRN
Status: DISCONTINUED | OUTPATIENT
Start: 2024-09-06 | End: 2024-09-06 | Stop reason: HOSPADM

## 2024-09-06 RX ORDER — SODIUM CHLORIDE 0.9 % (FLUSH) 0.9 %
5-40 SYRINGE (ML) INJECTION EVERY 12 HOURS SCHEDULED
Status: DISCONTINUED | OUTPATIENT
Start: 2024-09-06 | End: 2024-09-06 | Stop reason: HOSPADM

## 2024-09-06 RX ORDER — PROPOFOL 10 MG/ML
INJECTION, EMULSION INTRAVENOUS PRN
Status: DISCONTINUED | OUTPATIENT
Start: 2024-09-06 | End: 2024-09-06 | Stop reason: SDUPTHER

## 2024-09-06 RX ADMIN — DEXMEDETOMIDINE HYDROCHLORIDE 2 MCG: 100 INJECTION, SOLUTION INTRAVENOUS at 10:19

## 2024-09-06 RX ADMIN — PROPOFOL 20 MG: 10 INJECTION, EMULSION INTRAVENOUS at 10:25

## 2024-09-06 RX ADMIN — MIDAZOLAM 1 MG: 1 INJECTION INTRAMUSCULAR; INTRAVENOUS at 10:03

## 2024-09-06 RX ADMIN — ONDANSETRON 4 MG: 2 INJECTION INTRAMUSCULAR; INTRAVENOUS at 10:12

## 2024-09-06 RX ADMIN — DEXMEDETOMIDINE HYDROCHLORIDE 4 MCG: 100 INJECTION, SOLUTION INTRAVENOUS at 10:03

## 2024-09-06 RX ADMIN — PROPOFOL 20 MG: 10 INJECTION, EMULSION INTRAVENOUS at 10:04

## 2024-09-06 RX ADMIN — SODIUM CHLORIDE 3000 MG: 900 INJECTION INTRAVENOUS at 09:54

## 2024-09-06 RX ADMIN — MIDAZOLAM 0.5 MG: 1 INJECTION INTRAMUSCULAR; INTRAVENOUS at 10:01

## 2024-09-06 RX ADMIN — METHOCARBAMOL 500 MG: 100 INJECTION INTRAMUSCULAR; INTRAVENOUS at 10:07

## 2024-09-06 RX ADMIN — MIDAZOLAM 0.5 MG: 1 INJECTION INTRAMUSCULAR; INTRAVENOUS at 10:00

## 2024-09-06 RX ADMIN — PROPOFOL 50 MCG/KG/MIN: 10 INJECTION, EMULSION INTRAVENOUS at 10:07

## 2024-09-06 RX ADMIN — FAMOTIDINE 20 MG: 10 INJECTION INTRAVENOUS at 10:00

## 2024-09-06 RX ADMIN — APREPITANT 40 MG: 40 CAPSULE ORAL at 09:51

## 2024-09-06 RX ADMIN — ACETAMINOPHEN 1000 MG: 500 TABLET ORAL at 09:51

## 2024-09-06 RX ADMIN — LIDOCAINE HYDROCHLORIDE 40 MG: 20 INJECTION, SOLUTION INFILTRATION; PERINEURAL at 10:04

## 2024-09-06 RX ADMIN — SODIUM CHLORIDE: 9 INJECTION, SOLUTION INTRAVENOUS at 10:00

## 2024-09-06 ASSESSMENT — PAIN DESCRIPTION - PAIN TYPE
TYPE: CHRONIC PAIN
TYPE: CHRONIC PAIN

## 2024-09-06 ASSESSMENT — PAIN SCALES - GENERAL
PAINLEVEL_OUTOF10: 9
PAINLEVEL_OUTOF10: 5

## 2024-09-06 ASSESSMENT — PAIN DESCRIPTION - FREQUENCY: FREQUENCY: CONTINUOUS

## 2024-09-06 ASSESSMENT — PAIN DESCRIPTION - DESCRIPTORS
DESCRIPTORS: ACHING

## 2024-09-06 ASSESSMENT — PAIN DESCRIPTION - ORIENTATION
ORIENTATION: LEFT
ORIENTATION: LEFT

## 2024-09-06 ASSESSMENT — PAIN DESCRIPTION - LOCATION
LOCATION: HIP
LOCATION: HIP

## 2024-09-06 ASSESSMENT — PAIN - FUNCTIONAL ASSESSMENT
PAIN_FUNCTIONAL_ASSESSMENT: 0-10
PAIN_FUNCTIONAL_ASSESSMENT: PREVENTS OR INTERFERES SOME ACTIVE ACTIVITIES AND ADLS

## 2024-09-06 NOTE — ANESTHESIA POSTPROCEDURE EVALUATION
Department of Anesthesiology  Postprocedure Note    Patient: Julia Garcia  MRN: [id number]  YOB: 1950  Date of evaluation: 9/6/2024    Procedure Summary       Date: 09/06/24 Room / Location: [address]    Anesthesia Start: 1000 Anesthesia Stop: 1052    Procedure: BILATERAL L3-4 L4-5 MINIMALLY INVASIVE LUMBAR DECOMPRESSION-VERTOS (Bilateral: Spine Lumbar) Diagnosis:       Spinal stenosis of lumbar region with neurogenic claudication      (Spinal stenosis of lumbar region with neurogenic claudication [M48.062])    Surgeons: Dano Fontana MD Responsible Provider: Galileo Walker MD    Anesthesia Type: MAC ASA Status: 3            Anesthesia Type: MAC    Yolanda Phase I: Yolanda Score: 10    Yolanda Phase II: Yolanda Score: 10    Anesthesia Post Evaluation    Patient location during evaluation: PACU  Patient participation: complete - patient participated  Level of consciousness: awake and alert  Airway patency: patent  Nausea & Vomiting: no nausea and no vomiting  Cardiovascular status: hemodynamically stable  Respiratory status: acceptable  Hydration status: euvolemic  Pain management: adequate    No notable events documented.

## 2024-09-06 NOTE — DISCHARGE INSTRUCTIONS
[facility] DISCHARGE INSTRUCTIONS      Instructions    Call Dr. Fontana for questions or concerns at [phone].      Keep your tegaderm dressing dry.  Do not shower until otherwise instructed.  Sponge bathe only.  Do not lift over 5 pounds.  No bending, twisting, or lifting.  Avoid activities with arms over your head.      GENERAL SURGERY DISCHARGE INSTRUCTIONS     Follow your surgeons instructions.  Follow up with your surgeon as directed.  Observe the operative area for signs of excessive bleeding.If needed apply pressure,elevate if able and contact your surgeon.  Observe the operative site for any signs of infection- such as increased pain,redness,fever greater than 101 degrees,swelling, foul odor or drainage.Contact your surgeon if any of these symptoms are present.  Keep operative site clean and dry.  Apply ice to operative site for 15-30 minutes of each hour while awake for 24-36 hours  Do not remove dressing unless instructed to by surgeon.  Avoid pulling,pushing or tugging to suture line.  If you become short of breath call your doctor or go to the ER.  Resume and take medications as directed.  Pain medication should be taken with food.  Do not drive or operate machinery while taking narcotics.  For any problems or question call your surgeon.         ANESTHESIA DISCHARGE INSTRUCTIONS     Wear your seatbelt home.  You are under the influence of drugs-do not drink alcohol, drive, operate machinery, make any important decisions or sign any legal documents for 24 hours.  Children should not ride bikes, skate boards or play on gym sets for 24 hours after surgery.  A responsible adult needs to be with you for 24 hours.  You may experience lightheadedness, dizziness, or sleepiness following surgery.  Rest at home today- increase activity as tolerated.  Progress slowly to a regular diet unless your physician has instructed you otherwise. Drink plenty of water.  If persistent nausea and vomiting becomes a problem, call  your physician.  Coughing, sore throat and muscle aches are other side effects of anesthesia, and should improve with time.  Do not drive or operate machinery while taking narcotics.

## 2024-09-06 NOTE — PROGRESS NOTES
Taking fluids well.  Denies nausea or back pain.  Left hip pain decreased.  Reviewed discharge instructions with patient and friend.  Questions answered.  Patient asked about resuming Robaxin, takes 2x daily.  Explained a.m. dose given IV in surgery, may resume p.o. with evening dose.   States understands, written on discharge instructions.

## 2024-09-06 NOTE — PROGRESS NOTES
Assisted to dress, then discharged to home with friend, Jessie.  Has instructions, walker, glasses and all belongings.

## 2024-09-06 NOTE — ANESTHESIA PRE PROCEDURE
14.7 12/11/2018 10:08 AM    HCT 43.3 12/11/2018 10:08 AM    MCV 96.9 12/11/2018 10:08 AM    RDW 13.7 12/11/2018 10:08 AM     12/11/2018 10:08 AM       CMP:   Lab Results   Component Value Date/Time     12/11/2018 10:08 AM    K 5.0 12/11/2018 10:08 AM     12/11/2018 10:08 AM    CO2 25 12/11/2018 10:08 AM    BUN 18 12/11/2018 10:08 AM    CREATININE 0.9 12/11/2018 10:08 AM    GFRAA >60 12/11/2018 10:08 AM    AGRATIO 2.1 12/11/2018 10:08 AM    LABGLOM >60 12/11/2018 10:08 AM    GLUCOSE 110 12/11/2018 10:08 AM    CALCIUM 9.7 12/11/2018 10:08 AM    BILITOT 0.4 12/11/2018 10:08 AM    ALKPHOS 105 12/11/2018 10:08 AM    AST 31 12/11/2018 10:08 AM    ALT 41 12/11/2018 10:08 AM       POC Tests: No results for input(s): \"POCGLU\", \"POCNA\", \"POCK\", \"POCCL\", \"POCBUN\", \"POCHEMO\", \"POCHCT\" in the last 72 hours.    Coags: No results found for: \"PROTIME\", \"INR\", \"APTT\"    HCG (If Applicable): No results found for: \"PREGTESTUR\", \"PREGSERUM\", \"HCG\", \"HCGQUANT\"     ABGs: No results found for: \"PHART\", \"PO2ART\", \"WVK5YQF\", \"HDO7BPR\", \"BEART\", \"N7BUYNHN\"     Type & Screen (If Applicable):  No results found for: \"ABORH\", \"LABANTI\"    Drug/Infectious Status (If Applicable):  No results found for: \"HIV\", \"HEPCAB\"    COVID-19 Screening (If Applicable): No results found for: \"COVID19\"        Anesthesia Evaluation  Patient summary reviewed   history of anesthetic complications: PONV.  Airway: Mallampati: I  TM distance: >3 FB     Mouth opening: > = 3 FB   Dental: normal exam         Pulmonary:normal exam    (+)     sleep apnea: on CPAP,                                  Cardiovascular:    (+) hyperlipidemia                  Neuro/Psych:   (+) neuromuscular disease (Fibromyalgia):, TIA (2018, no residual)depression/anxiety             GI/Hepatic/Renal:   (+) GERD:, morbid obesity          Endo/Other:    (+) hypothyroidism::..                 Abdominal:             Vascular:          Other Findings:       Anesthesia  Statement Selected

## 2024-09-06 NOTE — BRIEF OP NOTE
Brief Postoperative Note      Patient: Julia Garcia  YOB: 1950  MRN: [id number]    Date of Procedure: 9/6/2024    Pre-Op Diagnosis Codes:      * Spinal stenosis of lumbar region with neurogenic claudication [M48.062]    Post-Op Diagnosis: Same       Procedure(s):  BILATERAL L3-4 L4-5 MINIMALLY INVASIVE LUMBAR DECOMPRESSION-VERTOS    Surgeon(s):  Dixon Fontana MD    Assistant:  Surgical Assistant: Mar Rosado    Anesthesia: Monitor Anesthesia Care    Estimated Blood Loss (mL): Minimal    Complications: None    Specimens:   * No specimens in log *    Implants:  * No implants in log *      Drains: * No LDAs found *    Findings:  Infection Present At Time Of Surgery (PATOS) (choose all levels that have infection present):  No infection present  Other Findings:     Electronically signed by DIXON FONTANA MD on 9/6/2024 at 10:53 AM

## 2024-09-06 NOTE — PROGRESS NOTES
Received in PACU Phase 1 Recovery from OR.  Awake, alert.  VSS.  Respirations easy on room air.  Denies back or leg pain.  States chronic pain in left hip \"as always\".  Pillow placed underneath bent knees per patient request.  States helps with left hip pain.  Denies nausea.

## 2024-09-09 NOTE — OP NOTE
Operative Note      Patient: Julia Garcia  YOB: 1950  MRN: [id number]    Date of Procedure: 9/6/2024    Pre-Op Diagnosis Codes:      * Spinal stenosis of lumbar region with neurogenic claudication [M48.062]    Post-Op Diagnosis: Same       Procedure(s):  BILATERAL L3-4 L4-5 MINIMALLY INVASIVE LUMBAR DECOMPRESSION-VERTOS    Surgeon(s):  Dano Fontana MD    Assistant:   Surgical Assistant: Mar Rosado    Anesthesia: Monitor Anesthesia Care    Estimated Blood Loss (mL): Minimal    Complications: None    Specimens:   * No specimens in log *    Implants:  * No implants in log *      Drains: * No LDAs found *    Findings:  Infection Present At Time Of Surgery (PATOS) (choose all levels that have infection present):  No infection present  Other Findings:     Detailed Description of Procedure:   PROCEDURE(S) PERFORMED:      Bilateral L4-L5 and Bilateral L3-L4 percutaneous lumbar decompression  Fluoroscopic guidance  Epidurogram at the target area that is being treated and debulked             INDICATIONS FOR PROCEDURE: The patient has a history of chronic pain that has been going on for years; her pump provided her with excellent relief as she failed oral therapy.  The patient decided that she no longer want to have the pump and wanted it to be removed.       INDICATIONS FOR PROCEDURE:  The patient's ID was confirmed, and a time-out was performed. Time, site, location, and procedure agreed upon and consented for were reviewed. The patient was radiologically confirmed to be suffering from clinically symptomatic lumbar spinal stenosis and confirmed neurogenic claudication. The patient's condition has progressed such that activities of daily living are limited due to pain intensity and neurogenic claudication symptomology. Standing time and walking distance have declined steadily.      Patient's clinical notes for office visits show tried and failed conservative treatments, such as physical therapy,

## (undated) DEVICE — WIPE INSTR W73XL73CM VISITEC

## (undated) DEVICE — 3M™ IOBAN™ 2 ANTIMICROBIAL INCISE DRAPE 6650EZ: Brand: IOBAN™ 2

## (undated) DEVICE — TRAY EPI W/ 17GA 3.5IN TUOHY BKEYE LUMN PENCAN 25GA 5IN

## (undated) DEVICE — MASC MINOR: Brand: MEDLINE INDUSTRIES, INC.

## (undated) DEVICE — 1010 S-DRAPE TOWEL DRAPE 10/BX: Brand: STERI-DRAPE™

## (undated) DEVICE — NEEDLE SPNL 22GA L3.5IN BLK HUB S STL REG WALL FIT STYL

## (undated) DEVICE — MEDICINE CUP, GRADUATED, STER: Brand: MEDLINE

## (undated) DEVICE — APPLICATOR MEDICATED 26 CC SOLUTION HI LT ORNG CHLORAPREP

## (undated) DEVICE — LIQUIBAND RAPID ADHESIVE 36/CS 0.8ML: Brand: MEDLINE

## (undated) DEVICE — HYPODERMIC SAFETY NEEDLE: Brand: MAGELLAN

## (undated) DEVICE — DRAPE,SPLIT ,77X120: Brand: MEDLINE

## (undated) DEVICE — 3M™ TEGADERM™ TRANSPARENT FILM DRESSING FRAME STYLE, 1626W, 4 IN X 4-3/4 IN (10 CM X 12 CM), 50/CT 4CT/CASE: Brand: 3M™ TEGADERM™

## (undated) DEVICE — AVANOS* EXTENSION SETS: Brand: EXTENSION SET, MINI BORE, 12" LENGTH, 0.50ML VOLUME 25

## (undated) DEVICE — SUTURE MONOCRYL + SZ 4-0 L27IN ABSRB UD L19MM PS-2 3/8 CIR MCP426H

## (undated) DEVICE — C-ARM: Brand: UNBRANDED

## (undated) DEVICE — GLOVE ORANGE PI 7 1/2   MSG9075

## (undated) DEVICE — Device: Brand: MILD DEVICE KIT

## (undated) DEVICE — SYRINGE MED 30ML STD CLR PLAS LUERLOCK TIP N CTRL DISP

## (undated) DEVICE — NEEDLE SPNL 22GA L5IN BLK HUB S STL W/ QNCKE PNT W/OUT

## (undated) DEVICE — SOLUTION IRRIG 500ML 0.9% SOD CHLO USP POUR PLAS BTL